# Patient Record
(demographics unavailable — no encounter records)

---

## 2024-11-01 NOTE — ASSESSMENT
[FreeTextEntry1] : Fibromyalgia  - labs as below. will call pt with results - c/w Lyrica 100mg at bedtime, Flexeril 5-10mg at bedtime, Lexapro - will add Lyrica 50mg in AM. If no improvement consider addition of Savella - conservative treatment of the patient's condition- including rest, ice, heat, anti-inflammatory medications, activity modifications, home stretching and strengthening exercises daily. - consider sleep study for WILVER- reports snoring at night  Discussed treatment plan with the patient. The patient was given the opportunity to ask questions and all questions were answered to their satisfaction.

## 2025-06-03 NOTE — HISTORY OF PRESENT ILLNESS
[FreeTextEntry1] : REASON :   chest pain   HPI for today: : 6 3 2025  feels good. no chest pain . no dyspnea on exertion . no syncope. no syncope.   does not exercise. physkcally active.    old NOTE: This is a 57 year old woman, with history of fibromyalgia and chronic pain and palpitiosn and anxiety  onmetoprolol here for coronary artery disease evalaution.   she has been a little down and fatigue.  but because  her mother passed away recently august 6th.  afte rhe marriage.  adn she has not been feelign so chirpy.  she is not depressed. she is taking metop;rolol for a long time.  her mother had sudden cardiac death.  she is taking davina pro 10 mg.  she has been takign that fora long time.  no palptaitons. no diziznes.s no syncope.   when she estands up quickly she migh t feel dizzy,.  numbness in the hands . both hands.   No smoking. occasiaonal alcohol. No drugs.    F amily history:  Father:   + myocardial infarction. no Cerebro vascular accident  Mother :   Sudden cardiac death at 70.  Unclear.  No cva.  Siblings:  No myocardial infarction.  No CVA

## 2025-06-03 NOTE — CARDIOLOGY SUMMARY
[de-identified] : 6 3 2025:   11 21 2023 :  Sinus Rhythm  -RSR(V1) -nondiagnostic.  PROBABLY NORMAL [de-identified] : feb 2024:  CAC = 0/  5 mm nodule.  [place] coronary arterties.  [de-identified] : nov 2023:  LDL : 82 .  Tgs: 72.   HDL: 67.  Totla: 163

## 2025-06-03 NOTE — DISCUSSION/SUMMARY
[Patient] : the patient [Risks] : risks [Benefits] : benefits [Alternatives] : alternatives [With Me] : with me [___ Year(s)] : in [unfilled] year(s) [FreeTextEntry1] : This is a 59 year old woman, with history of fibromyalgia and chronic pain and palpitiosn and anxiety  on metoprolol here for coronary artery disease evalaution.   1)  palpitations: ct metoprolol fro now.  tolerating meds well.  no issues. will ct .  transthoracic echocardiogram  2)  family history of premature coronary artery disease: normal coroanry CTa  3) dyslipidemia screening:  get the result of cholesterol from [facility]  ct diet adn exercise  4) Lung nodule.  CT chest in  sept 2026.      [EKG obtained to assist in diagnosis and management of assessed problem(s)] : EKG obtained to assist in diagnosis and management of assessed problem(s)